# Patient Record
Sex: FEMALE | Race: BLACK OR AFRICAN AMERICAN | NOT HISPANIC OR LATINO | URBAN - NONMETROPOLITAN AREA
[De-identification: names, ages, dates, MRNs, and addresses within clinical notes are randomized per-mention and may not be internally consistent; named-entity substitution may affect disease eponyms.]

---

## 2020-08-11 ENCOUNTER — OFFICE VISIT (OUTPATIENT)
Dept: URBAN - NONMETROPOLITAN AREA CLINIC 2 | Facility: CLINIC | Age: 65
End: 2020-08-11
Payer: MEDICARE

## 2020-08-11 DIAGNOSIS — K85.90 PANCREATITIS: ICD-10-CM

## 2020-08-11 DIAGNOSIS — K21.9 ESOPHAGEAL REFLUX: ICD-10-CM

## 2020-08-11 DIAGNOSIS — Z12.11 COLON CANCER SCREENING: ICD-10-CM

## 2020-08-11 DIAGNOSIS — R19.7 DIARRHEA: ICD-10-CM

## 2020-08-11 DIAGNOSIS — K57.92 DIVERTICULITIS: ICD-10-CM

## 2020-08-11 DIAGNOSIS — K57.30 DIVERTICULA OF COLON: ICD-10-CM

## 2020-08-11 DIAGNOSIS — D50.9 IDA (IRON DEFICIENCY ANEMIA): ICD-10-CM

## 2020-08-11 PROCEDURE — 3017F COLORECTAL CA SCREEN DOC REV: CPT | Performed by: INTERNAL MEDICINE

## 2020-08-11 PROCEDURE — G8427 DOCREV CUR MEDS BY ELIG CLIN: HCPCS | Performed by: INTERNAL MEDICINE

## 2020-08-11 PROCEDURE — G9903 PT SCRN TBCO ID AS NON USER: HCPCS | Performed by: INTERNAL MEDICINE

## 2020-08-11 PROCEDURE — 99213 OFFICE O/P EST LOW 20 MIN: CPT | Performed by: INTERNAL MEDICINE

## 2020-08-11 RX ORDER — HYOSCYAMINE SULFATE 0.12 MG/1
PLACE 1 TABLET UNDER TONGUE BY TRANSLINGUAL ROUTE EVERY 4 TO 6 HOURS AS NEEDED  SPASM/ABDOMINAL PAIN TABLET, ORALLY DISINTEGRATING ORAL
Qty: 60 | Refills: 2 | Status: ACTIVE | COMMUNITY
Start: 2019-06-24

## 2020-08-11 RX ORDER — ATENOLOL AND CHLORTHALIDONE 100; 25 MG/1; MG/1
TAKE 1 TABLET BY ORAL ROUTE ONCE DAILY TABLET ORAL 1
Qty: 0 | Refills: 0 | Status: ACTIVE | COMMUNITY
Start: 1900-01-01

## 2020-08-11 RX ORDER — PANTOPRAZOLE SODIUM 40 MG/1
TAKE 1 TABLET (40 MG) BY ORAL ROUTE ONCE DAILY TABLET, DELAYED RELEASE ORAL 1
Qty: 90 | Refills: 3 | Status: ACTIVE | COMMUNITY
Start: 2020-02-13 | End: 2021-02-07

## 2020-08-11 RX ORDER — FAMOTIDINE 20 MG/1
TAKE 1 TABLET (20 MG) BY ORAL ROUTE 2 TIMES PER DAY TABLET ORAL 2
Qty: 180 | Refills: 3 | Status: ACTIVE | COMMUNITY
Start: 2020-01-02 | End: 2020-12-27

## 2020-08-11 NOTE — HPI-TODAY'S VISIT:
8/11/20 The patient presents today for follow-up of her anemia and history of abdominal pain with diarrhea.  Since our last visit, her reflux has improved on PPI.  She also has been feeling better since she has been on a probiotic.  She does continue to have occasional diarrhea.  She was recently put on colchicine for her gout, and she does think now that she is off the colchicine, her diarrhea has improved.  She does have labs due this week with hematology.  We do not have these results today.  Overall, from a GI standpoint, she is feeling quite well today. History Of Present Illness    2/13/2020 Mrs. Ritter presents for [facility] of IRINEO, history of pancreatitis, diverticulitis and epigastric abdominal pain. Since her last visit her labs show mildly positive H. pylori IGG. She did not get the pepcid as it is on back order. She continues to have epgiastric abdominal pain with dyspepsia. MB   1/2/2020 Mrs. ritter presents for follow up of IRINEO, pancreatitis, and diverticulitis. Since her last visit she has developed worsening post prandial epigastric abdominal pain. This has increased over the last week. This is not acute like the pancreatitis. She does not have nausea or vomiting with the pain. She is not taking anything for the symptoms. She denies any significant NSAID use. Her last EGD was in 2018 with gastritis. Her pancreatitis was diagnosed after diverticulitis and likely medication induced. Today this is her main complaint. MB  6/24/2019 Mrs. Ritter presents for follow up of IRINEO, Pancreatitis, and diverticulitis. Since her last visit her repeat MRCP shows no recurrence or chronic findings of pancreatitis. Her labs reveal significant IRINEO s/p EGD/Colon last year with no etiology. She is s/p VCE with gastritis, no bleeding or lesions. She is following with Dr. Stratton s/p IV iron with H/H up to above 10. She recently had another flare of diverticulitis documented on CT in sigmoid at [facility]. She was treated with flagyl and cipro and had to go back to the ED with epigastric pain after being on the flagyl again and this was discontinued. She is still taking her ranitidine daily and zenpep AC with improvement in her bowels, she does have some mild LLQ pain after her diverticulitis flare. Today she is doing better otherwise. MB

## 2020-10-28 ENCOUNTER — ERX REFILL RESPONSE (OUTPATIENT)
Age: 65
End: 2020-10-28

## 2020-10-28 RX ORDER — PANTOPRAZOLE SODIUM 40 MG/1
TAKE 1 TABLET BY MOUTH  ONCE DAILY TABLET, DELAYED RELEASE ORAL
Qty: 90 | Refills: 3

## 2021-02-09 ENCOUNTER — OFFICE VISIT (OUTPATIENT)
Dept: URBAN - NONMETROPOLITAN AREA CLINIC 2 | Facility: CLINIC | Age: 66
End: 2021-02-09
Payer: MEDICARE

## 2021-02-09 DIAGNOSIS — K59.00 CONSTIPATION, UNSPECIFIED CONSTIPATION TYPE: ICD-10-CM

## 2021-02-09 DIAGNOSIS — K85.90 PANCREATITIS: ICD-10-CM

## 2021-02-09 DIAGNOSIS — R19.7 DIARRHEA: ICD-10-CM

## 2021-02-09 DIAGNOSIS — K57.92 DIVERTICULITIS: ICD-10-CM

## 2021-02-09 DIAGNOSIS — K21.9 ESOPHAGEAL REFLUX: ICD-10-CM

## 2021-02-09 DIAGNOSIS — D50.9 IDA (IRON DEFICIENCY ANEMIA): ICD-10-CM

## 2021-02-09 DIAGNOSIS — K57.30 DIVERTICULA OF COLON: ICD-10-CM

## 2021-02-09 DIAGNOSIS — Z12.11 COLON CANCER SCREENING: ICD-10-CM

## 2021-02-09 DIAGNOSIS — R10.32 LLQ ABDOMINAL PAIN: ICD-10-CM

## 2021-02-09 PROCEDURE — G8427 DOCREV CUR MEDS BY ELIG CLIN: HCPCS | Performed by: INTERNAL MEDICINE

## 2021-02-09 PROCEDURE — G8482 FLU IMMUNIZE ORDER/ADMIN: HCPCS | Performed by: INTERNAL MEDICINE

## 2021-02-09 PROCEDURE — 99214 OFFICE O/P EST MOD 30 MIN: CPT | Performed by: INTERNAL MEDICINE

## 2021-02-09 PROCEDURE — 3017F COLORECTAL CA SCREEN DOC REV: CPT | Performed by: INTERNAL MEDICINE

## 2021-02-09 RX ORDER — HYOSCYAMINE SULFATE 0.12 MG/1
PLACE 1 TABLET UNDER TONGUE BY TRANSLINGUAL ROUTE EVERY 4 TO 6 HOURS AS NEEDED  SPASM/ABDOMINAL PAIN TABLET, ORALLY DISINTEGRATING ORAL
Qty: 60 | Refills: 2 | Status: ACTIVE | COMMUNITY
Start: 2019-06-24

## 2021-02-09 RX ORDER — ATENOLOL AND CHLORTHALIDONE 100; 25 MG/1; MG/1
TAKE 1 TABLET BY ORAL ROUTE ONCE DAILY TABLET ORAL 1
Qty: 0 | Refills: 0 | Status: ACTIVE | COMMUNITY
Start: 1900-01-01

## 2021-02-09 RX ORDER — DICYCLOMINE HYDROCHLORIDE 10 MG/1
1 CAPSULE CAPSULE ORAL
Qty: 90 CAPSULE | Refills: 6 | OUTPATIENT
Start: 2021-02-09 | End: 2021-09-07

## 2021-02-09 RX ORDER — PANTOPRAZOLE SODIUM 40 MG/1
TAKE 1 TABLET BY MOUTH  ONCE DAILY TABLET, DELAYED RELEASE ORAL
Qty: 90 | Refills: 3 | Status: ACTIVE | COMMUNITY

## 2021-02-09 NOTE — HPI-TODAY'S VISIT:
8/11/20 The patient presents today for follow-up of her anemia and history of abdominal pain with diarrhea.  Since our last visit, her reflux has improved on PPI.  She also has been feeling better since she has been on a probiotic.  She does continue to have occasional diarrhea.  She was recently put on colchicine for her gout, and she does think now that she is off the colchicine, her diarrhea has improved.  She does have labs due this week with hematology.  We do not have these results today.  Overall, from a GI standpoint, she is feeling quite well today. History Of Present Illness    2/13/2020 Mrs. Ritter presents for [facility] of [place], history of pancreatitis, diverticulitis and epigastric abdominal pain. Since her last visit her labs show mildly positive H. pylori IGG. She did not get the pepcid as it is on back order. She continues to have epgiastric abdominal pain with dyspepsia. MB  1/2/2020 Mrs. ritter presents for follow up of IRINEO, pancreatitis, and diverticulitis. Since her last visit she has developed worsening post prandial epigastric abdominal pain. This has increased over the last week. This is not acute like the pancreatitis. She does not have nausea or vomiting with the pain. She is not taking anything for the symptoms. She denies any significant NSAID use. Her last EGD was in 2018 with gastritis. Her pancreatitis was diagnosed after diverticulitis and likely medication induced. Today this is her main complaint. MB 6/24/2019 Mrs. Ritter presents for follow up of IRINEO, Pancreatitis, and diverticulitis. Since her last visit her repeat MRCP shows no recurrence or chronic findings of pancreatitis. Her labs reveal significant IRINEO s/p EGD/Colon last year with no etiology. She is s/p VCE with gastritis, no bleeding or lesions. She is following with Dr. Stratton s/p IV iron with H/H up to above 10. She recently had another flare of diverticulitis documented on CT in sigmoid at [facility]. She was treated with flagyl and cipro and had to go back to the ED with epigastric pain after being on the flagyl again and this was discontinued. She is still taking her ranitidine daily and zenpep AC with improvement in her bowels, she does have some mild LLQ pain after her diverticulitis flare. Today she is doing better otherwise. MB 2-9-21 Ms. Ritter presents today for follow-up of her anemia and abdominal pain.  Since our last visit, she has developed worsening left lower quadrant abdominal pain.  She feels that her bowels are not moving as regularly as they were previously.  She is taking daily Metamucil, but she is not taking anything else for her bowels.  She continues to take Protonix 40 mg once a day, and this does seem to control her reflux.  She does have a history of diverticulosis as well.  She has had an EGD and colonoscopy in her work-up for iron deficiency anemia.  She thinks she has had her blood work done recently, but this was for gout.  She is willing to have her blood work really drawn today.  Overall, she continues to complain of left lower quadrant abdominal pain with constipation.

## 2021-02-10 ENCOUNTER — TELEPHONE ENCOUNTER (OUTPATIENT)
Dept: URBAN - METROPOLITAN AREA CLINIC 92 | Facility: CLINIC | Age: 66
End: 2021-02-10

## 2021-02-10 LAB
A/G RATIO: 1.3
ALBUMIN: 4.1
ALKALINE PHOSPHATASE: 103
ALT (SGPT): 5
AST (SGOT): 6
BASO (ABSOLUTE): 0.1
BASOS: 0
BILIRUBIN, TOTAL: 0.3
BUN/CREATININE RATIO: 21
BUN: 24
CALCIUM: 10.2
CARBON DIOXIDE, TOTAL: 28
CHLORIDE: 99
CREATININE: 1.16
EGFR IF AFRICN AM: 76
EGFR IF NONAFRICN AM: 66
EOS (ABSOLUTE): 0.4
EOS: 2
GLOBULIN, TOTAL: 3.2
GLUCOSE: 143
HEMATOCRIT: 32.7
HEMATOLOGY COMMENTS:: (no result)
HEMOGLOBIN: 10.8
IMMATURE CELLS: (no result)
IMMATURE GRANS (ABS): 0.1
IMMATURE GRANULOCYTES: 1
LYMPHS (ABSOLUTE): 1.8
LYMPHS: 12
MCH: 29.4
MCHC: 33
MCV: 89
MONOCYTES(ABSOLUTE): 0.7
MONOCYTES: 4
NEUTROPHILS (ABSOLUTE): 12.4
NEUTROPHILS: 81
NRBC: (no result)
PLATELETS: 278
POTASSIUM: 4.2
PROTEIN, TOTAL: 7.3
RBC: 3.67
RDW: 13.5
SODIUM: 142
WBC: 15.4

## 2021-02-10 RX ORDER — DICYCLOMINE HYDROCHLORIDE 10 MG/1
1 CAPSULE CAPSULE ORAL
Qty: 60 CAPSULE | Refills: 11
Start: 2021-02-09 | End: 2022-02-04

## 2021-05-11 ENCOUNTER — WEB ENCOUNTER (OUTPATIENT)
Dept: URBAN - NONMETROPOLITAN AREA CLINIC 2 | Facility: CLINIC | Age: 66
End: 2021-05-11

## 2021-05-11 ENCOUNTER — OFFICE VISIT (OUTPATIENT)
Dept: URBAN - NONMETROPOLITAN AREA CLINIC 2 | Facility: CLINIC | Age: 66
End: 2021-05-11
Payer: MEDICARE

## 2021-05-11 VITALS
HEART RATE: 83 BPM | WEIGHT: 154 LBS | DIASTOLIC BLOOD PRESSURE: 75 MMHG | SYSTOLIC BLOOD PRESSURE: 111 MMHG | HEIGHT: 68 IN | TEMPERATURE: 97.2 F | BODY MASS INDEX: 23.34 KG/M2

## 2021-05-11 DIAGNOSIS — D50.9 IDA (IRON DEFICIENCY ANEMIA): ICD-10-CM

## 2021-05-11 DIAGNOSIS — K21.9 ESOPHAGEAL REFLUX: ICD-10-CM

## 2021-05-11 DIAGNOSIS — Z12.11 COLON CANCER SCREENING: ICD-10-CM

## 2021-05-11 DIAGNOSIS — R19.7 DIARRHEA: ICD-10-CM

## 2021-05-11 DIAGNOSIS — K85.90 PANCREATITIS: ICD-10-CM

## 2021-05-11 DIAGNOSIS — K57.92 DIVERTICULITIS: ICD-10-CM

## 2021-05-11 DIAGNOSIS — K59.00 CONSTIPATION, UNSPECIFIED CONSTIPATION TYPE: ICD-10-CM

## 2021-05-11 PROCEDURE — 99214 OFFICE O/P EST MOD 30 MIN: CPT | Performed by: INTERNAL MEDICINE

## 2021-05-11 RX ORDER — ATENOLOL AND CHLORTHALIDONE 100; 25 MG/1; MG/1
TAKE 1 TABLET BY ORAL ROUTE ONCE DAILY TABLET ORAL 1
Qty: 0 | Refills: 0 | Status: ACTIVE | COMMUNITY
Start: 1900-01-01

## 2021-05-11 RX ORDER — HYOSCYAMINE SULFATE 0.12 MG/1
PLACE 1 TABLET UNDER TONGUE BY TRANSLINGUAL ROUTE EVERY 4 TO 6 HOURS AS NEEDED  SPASM/ABDOMINAL PAIN TABLET, ORALLY DISINTEGRATING ORAL
Qty: 60 | Refills: 2 | Status: ACTIVE | COMMUNITY
Start: 2019-06-24

## 2021-05-11 RX ORDER — DICYCLOMINE HYDROCHLORIDE 10 MG/1
1 CAPSULE CAPSULE ORAL
Qty: 60 CAPSULE | Refills: 11 | Status: ACTIVE | COMMUNITY
Start: 2021-02-09 | End: 2022-02-04

## 2021-05-11 RX ORDER — PANTOPRAZOLE SODIUM 40 MG/1
TAKE 1 TABLET BY MOUTH  ONCE DAILY TABLET, DELAYED RELEASE ORAL
Qty: 90 | Refills: 3 | Status: ACTIVE | COMMUNITY

## 2021-05-11 RX ORDER — AMOXICILLIN AND CLAVULANATE POTASSIUM 875; 125 MG/1; MG/1
1 TABLET TABLET, FILM COATED ORAL
Qty: 20 | OUTPATIENT
Start: 2021-05-11 | End: 2021-05-21

## 2021-05-11 RX ORDER — FLUCONAZOLE 150 MG/1
1 TABLET TABLET ORAL DAILY
Qty: 1 TABLET | Refills: 1 | OUTPATIENT
Start: 2021-05-11 | End: 2021-05-13

## 2021-05-11 NOTE — HPI-TODAY'S VISIT:
6/24/2019 Mrs. Ritter presents for follow up of IRINEO, Pancreatitis, and diverticulitis. Since her last visit her repeat MRCP shows no recurrence or chronic findings of pancreatitis. Her labs reveal significant IRINEO s/p EGD/Colon last year with no etiology. She is s/p VCE with gastritis, no bleeding or lesions. She is following with Dr. Stratton s/p IV iron with H/H up to above 10. She recently had another flare of diverticulitis documented on CT in sigmoid at [facility]. She was treated with flagyl and cipro and had to go back to the ED with epigastric pain after being on the flagyl again and this was discontinued. She is still taking her ranitidine daily and zenpep AC with improvement in her bowels, she does have some mild LLQ pain after her diverticulitis flare. Today she is doing better otherwise. MB  1/2/2020 Mrs. ritter presents for follow up of IRINEO, pancreatitis, and diverticulitis. Since her last visit she has developed worsening post prandial epigastric abdominal pain. This has increased over the last week. This is not acute like the pancreatitis. She does not have nausea or vomiting with the pain. She is not taking anything for the symptoms. She denies any significant NSAID use. Her last EGD was in 2018 with gastritis. Her pancreatitis was diagnosed after diverticulitis and likely medication induced. Today this is her main complaint. MB  2/13/2020 Mrs. Ritter presents for [facility] of [place]O, history of pancreatitis, diverticulitis and epigastric abdominal pain. Since her last visit her labs show mildly positive H. pylori IGG. She did not get the pepcid as it is on back order. She continues to have epgiastric abdominal pain with dyspepsia. MB   8/11/20 The patient presents today for follow-up of her anemia and history of abdominal pain with diarrhea.  Since our last visit, her reflux has improved on PPI.  She also has been feeling better since she has been on a probiotic.  She does continue to have occasional diarrhea.  She was recently put on colchicine for her gout, and she does think now that she is off the colchicine, her diarrhea has improved.  She does have labs due this week with hematology.  We do not have these results today.  Overall, from a GI standpoint, she is feeling quite well today.  2/9/2021 Ms. Ritter presents today for follow-up of her anemia and abdominal pain.  Since our last visit, she has developed worsening left lower quadrant abdominal pain.  She feels that her bowels are not moving as regularly as they were previously.  She is taking daily Metamucil, but she is not taking anything else for her bowels.  She continues to take Protonix 40 mg once a day, and this does seem to control her reflux.  She does have a history of diverticulosis as well.  She has had an EGD and colonoscopy in her work-up for iron deficiency anemia.  She thinks she has had her blood work done recently, but this was for gout.  She is willing to have her blood work really drawn today.  Overall, she continues to complain of left lower quadrant abdominal pain with constipation.  5/11/2021 Mrs. Ritter presents for follow-up from [facility] with recent evaluation in the ED for abdominal pain diarrhea and rectal bleeding.  She had a contrasted CT scan that shows left-sided colitis, there is not 1 specific diverticula inflamed but she has moderate diverticulosis of the left colon.  She does have a history of documented diverticulitis last in 2019.  At that time she was treated with Flagyl and Rocephin and subsequently developed pancreatitis.  She has a history of gallstone pancreatitis in the distant past, there was concern that the Flagyl caused pancreatitis during her diverticulitis treatment she was given Flagyl and Omnicef on discharge.  She started the Flagyl with severe epigastric pain nausea and vomiting.  She has been off antibiotics for 24 hours and her left lower quadrant pain and diarrhea have worsened.  She does notice bright red blood per rectum.  Her last colonoscopy was by Dr. Mclaughlin in 2018 with left-sided diverticulosis.  She is not allergic to penicillin.  She does not feel like she is having any symptoms of pancreatitis.  Today she agrees to pursue Augmentin therapy for diverticulitis.  MB

## 2021-05-13 ENCOUNTER — TELEPHONE ENCOUNTER (OUTPATIENT)
Dept: URBAN - METROPOLITAN AREA CLINIC 92 | Facility: CLINIC | Age: 66
End: 2021-05-13

## 2021-07-15 ENCOUNTER — OFFICE VISIT (OUTPATIENT)
Dept: URBAN - NONMETROPOLITAN AREA CLINIC 2 | Facility: CLINIC | Age: 66
End: 2021-07-15

## 2021-07-15 RX ORDER — ATENOLOL AND CHLORTHALIDONE 100; 25 MG/1; MG/1
TAKE 1 TABLET BY ORAL ROUTE ONCE DAILY TABLET ORAL 1
Qty: 0 | Refills: 0 | Status: ACTIVE | COMMUNITY
Start: 1900-01-01

## 2021-07-15 RX ORDER — PANTOPRAZOLE SODIUM 40 MG/1
TAKE 1 TABLET BY MOUTH  ONCE DAILY TABLET, DELAYED RELEASE ORAL
Qty: 90 | Refills: 3 | Status: ACTIVE | COMMUNITY

## 2021-07-15 RX ORDER — HYOSCYAMINE SULFATE 0.12 MG/1
PLACE 1 TABLET UNDER TONGUE BY TRANSLINGUAL ROUTE EVERY 4 TO 6 HOURS AS NEEDED  SPASM/ABDOMINAL PAIN TABLET, ORALLY DISINTEGRATING ORAL
Qty: 60 | Refills: 2 | Status: ACTIVE | COMMUNITY
Start: 2019-06-24

## 2021-07-15 RX ORDER — DICYCLOMINE HYDROCHLORIDE 10 MG/1
1 CAPSULE CAPSULE ORAL
Qty: 60 CAPSULE | Refills: 11 | Status: ACTIVE | COMMUNITY
Start: 2021-02-09 | End: 2022-02-04

## 2021-10-18 ENCOUNTER — LAB OUTSIDE AN ENCOUNTER (OUTPATIENT)
Dept: URBAN - NONMETROPOLITAN AREA CLINIC 2 | Facility: CLINIC | Age: 66
End: 2021-10-18

## 2021-10-18 ENCOUNTER — OFFICE VISIT (OUTPATIENT)
Dept: URBAN - NONMETROPOLITAN AREA CLINIC 2 | Facility: CLINIC | Age: 66
End: 2021-10-18
Payer: MEDICARE

## 2021-10-18 VITALS
HEART RATE: 77 BPM | DIASTOLIC BLOOD PRESSURE: 82 MMHG | TEMPERATURE: 97.6 F | HEIGHT: 68 IN | WEIGHT: 159 LBS | SYSTOLIC BLOOD PRESSURE: 133 MMHG | BODY MASS INDEX: 24.1 KG/M2

## 2021-10-18 DIAGNOSIS — R19.7 DIARRHEA: ICD-10-CM

## 2021-10-18 DIAGNOSIS — Z12.11 COLON CANCER SCREENING: ICD-10-CM

## 2021-10-18 DIAGNOSIS — K57.92 DIVERTICULITIS: ICD-10-CM

## 2021-10-18 DIAGNOSIS — K85.90 PANCREATITIS: ICD-10-CM

## 2021-10-18 DIAGNOSIS — K21.9 ESOPHAGEAL REFLUX: ICD-10-CM

## 2021-10-18 DIAGNOSIS — D50.9 IDA (IRON DEFICIENCY ANEMIA): ICD-10-CM

## 2021-10-18 DIAGNOSIS — K59.00 CONSTIPATION, UNSPECIFIED CONSTIPATION TYPE: ICD-10-CM

## 2021-10-18 PROCEDURE — 99214 OFFICE O/P EST MOD 30 MIN: CPT | Performed by: NURSE PRACTITIONER

## 2021-10-18 RX ORDER — ATENOLOL AND CHLORTHALIDONE 100; 25 MG/1; MG/1
TAKE 1 TABLET BY ORAL ROUTE ONCE DAILY TABLET ORAL 1
Qty: 0 | Refills: 0 | Status: ACTIVE | COMMUNITY
Start: 1900-01-01

## 2021-10-18 RX ORDER — PANTOPRAZOLE SODIUM 40 MG/1
TAKE 1 TABLET BY MOUTH  ONCE DAILY TABLET, DELAYED RELEASE ORAL
Qty: 90 | Refills: 3 | Status: ACTIVE | COMMUNITY

## 2021-10-18 RX ORDER — HYOSCYAMINE SULFATE 0.12 MG/1
PLACE 1 TABLET UNDER TONGUE BY TRANSLINGUAL ROUTE EVERY 4 TO 6 HOURS AS NEEDED  SPASM/ABDOMINAL PAIN TABLET, ORALLY DISINTEGRATING ORAL
Qty: 60 | Refills: 2 | Status: ACTIVE | COMMUNITY
Start: 2019-06-24

## 2021-10-18 RX ORDER — DICYCLOMINE HYDROCHLORIDE 10 MG/1
1 CAPSULE CAPSULE ORAL
Qty: 60 CAPSULE | Refills: 11 | Status: ACTIVE | COMMUNITY
Start: 2021-02-09 | End: 2022-02-04

## 2021-10-18 NOTE — HPI-TODAY'S VISIT:
6/24/2019 Mrs. Ritter presents for follow up of IRINEO, Pancreatitis, and diverticulitis. Since her last visit her repeat MRCP shows no recurrence or chronic findings of pancreatitis. Her labs reveal significant IRINOE s/p EGD/Colon last year with no etiology. She is s/p VCE with gastritis, no bleeding or lesions. She is following with Dr. Stratton s/p IV iron with H/H up to above 10. She recently had another flare of diverticulitis documented on CT in sigmoid at [facility]. She was treated with flagyl and cipro and had to go back to the ED with epigastric pain after being on the flagyl again and this was discontinued. She is still taking her ranitidine daily and zenpep AC with improvement in her bowels, she does have some mild LLQ pain after her diverticulitis flare. Today she is doing better otherwise. MB  1/2/2020 Mrs. ritter presents for follow up of IRINEO, pancreatitis, and diverticulitis. Since her last visit she has developed worsening post prandial epigastric abdominal pain. This has increased over the last week. This is not acute like the pancreatitis. She does not have nausea or vomiting with the pain. She is not taking anything for the symptoms. She denies any significant NSAID use. Her last EGD was in 2018 with gastritis. Her pancreatitis was diagnosed after diverticulitis and likely medication induced. Today this is her main complaint. MB  2/13/2020 Mrs. Ritter presents for [facility] of [place], history of pancreatitis, diverticulitis and epigastric abdominal pain. Since her last visit her labs show mildly positive H. pylori IGG. She did not get the pepcid as it is on back order. She continues to have epgiastric abdominal pain with dyspepsia. MB   8/11/20 The patient presents today for follow-up of her anemia and history of abdominal pain with diarrhea.  Since our last visit, her reflux has improved on PPI.  She also has been feeling better since she has been on a probiotic.  She does continue to have occasional diarrhea.  She was recently put on colchicine for her gout, and she does think now that she is off the colchicine, her diarrhea has improved.  She does have labs due this week with hematology.  We do not have these results today.  Overall, from a GI standpoint, she is feeling quite well today.  2/9/2021 Ms. Ritter presents today for follow-up of her anemia and abdominal pain.  Since our last visit, she has developed worsening left lower quadrant abdominal pain.  She feels that her bowels are not moving as regularly as they were previously.  She is taking daily Metamucil, but she is not taking anything else for her bowels.  She continues to take Protonix 40 mg once a day, and this does seem to control her reflux.  She does have a history of diverticulosis as well.  She has had an EGD and colonoscopy in her work-up for iron deficiency anemia.  She thinks she has had her blood work done recently, but this was for gout.  She is willing to have her blood work really drawn today.  Overall, she continues to complain of left lower quadrant abdominal pain with constipation.  5/11/2021 Mrs. Ritter presents for follow-up from [facility] with recent evaluation in the ED for abdominal pain diarrhea and rectal bleeding.  She had a contrasted CT scan that shows left-sided colitis, there is not 1 specific diverticula inflamed but she has moderate diverticulosis of the left colon.  She does have a history of documented diverticulitis last in 2019.  At that time she was treated with Flagyl and Rocephin and subsequently developed pancreatitis.  She has a history of gallstone pancreatitis in the distant past, there was concern that the Flagyl caused pancreatitis during her diverticulitis treatment she was given Flagyl and Omnicef on discharge.  She started the Flagyl with severe epigastric pain nausea and vomiting.  She has been off antibiotics for 24 hours and her left lower quadrant pain and diarrhea have worsened.  She does notice bright red blood per rectum.  Her last colonoscopy was by Dr. Mclaughlin in 2018 with left-sided diverticulosis.  She is not allergic to penicillin.  She does not feel like she is having any symptoms of pancreatitis.  Today she agrees to pursue Augmentin therapy for diverticulitis.  MB   10/18/2021 Mrs. Ritter presents for follow-up of diverticulitis.  Since her last visit she completed 5 days of Augmentin.  Her abdominal pain improved, however it caused such bad nausea she could not tolerate the medication for more than the 5 days.  Since discontinuing the antibiotic she has been constipated.  She has not been taking the MiraLAX regularly.  She has been trying to take fiber but does report bloating.  She does have some dull left lower quadrant abdominal aching but no fevers, she does not feel like she is having acute diverticulitis again.  Today we have discussed repeating her colonoscopy and given her persistent anemia and new onset diverticulitis she agrees to pursue.  She agrees to restart the bowel regimen with MiraLAX and fiber daily.  MB

## 2021-10-29 ENCOUNTER — ERX REFILL RESPONSE (OUTPATIENT)
Dept: URBAN - NONMETROPOLITAN AREA CLINIC 2 | Facility: CLINIC | Age: 66
End: 2021-10-29

## 2021-10-29 RX ORDER — PANTOPRAZOLE SODIUM 40 MG/1
TAKE 1 TABLET BY MOUTH  ONCE DAILY TABLET, DELAYED RELEASE ORAL
Qty: 90 | Refills: 3 | OUTPATIENT

## 2021-10-29 RX ORDER — PANTOPRAZOLE SODIUM 40 MG/1
TAKE 1 TABLET BY MOUTH ONCE DAILY TABLET, DELAYED RELEASE ORAL
Qty: 90 TABLET | Refills: 4 | OUTPATIENT

## 2021-11-03 ENCOUNTER — CLAIMS CREATED FROM THE CLAIM WINDOW (OUTPATIENT)
Dept: URBAN - METROPOLITAN AREA CLINIC 4 | Facility: CLINIC | Age: 66
End: 2021-11-03
Payer: MEDICARE

## 2021-11-03 ENCOUNTER — OFFICE VISIT (OUTPATIENT)
Dept: URBAN - NONMETROPOLITAN AREA SURGERY CENTER 1 | Facility: SURGERY CENTER | Age: 66
End: 2021-11-03
Payer: MEDICARE

## 2021-11-03 DIAGNOSIS — K52.89 (LYMPHOCYTIC) MICROSCOPIC COLITIS: ICD-10-CM

## 2021-11-03 DIAGNOSIS — K57.32 DIVERTICULITIS LARGE INTESTINE: ICD-10-CM

## 2021-11-03 DIAGNOSIS — K57.90 DIVERTICULOSIS OF INTESTINE, PART UNSPECIFIED, WITHOUT PERFORATION OR ABSCESS WITHOUT BLEEDING: ICD-10-CM

## 2021-11-03 DIAGNOSIS — R10.32 ABDOMINAL CRAMPING IN LEFT LOWER QUADRANT: ICD-10-CM

## 2021-11-03 PROCEDURE — 45380 COLONOSCOPY AND BIOPSY: CPT | Performed by: INTERNAL MEDICINE

## 2021-11-03 PROCEDURE — 88305 TISSUE EXAM BY PATHOLOGIST: CPT | Performed by: PATHOLOGY

## 2021-11-03 PROCEDURE — G8907 PT DOC NO EVENTS ON DISCHARG: HCPCS | Performed by: INTERNAL MEDICINE

## 2022-01-26 ENCOUNTER — OFFICE VISIT (OUTPATIENT)
Dept: URBAN - NONMETROPOLITAN AREA CLINIC 2 | Facility: CLINIC | Age: 67
End: 2022-01-26
Payer: MEDICARE

## 2022-01-26 VITALS
HEART RATE: 86 BPM | HEIGHT: 68 IN | BODY MASS INDEX: 23.95 KG/M2 | WEIGHT: 158 LBS | SYSTOLIC BLOOD PRESSURE: 115 MMHG | TEMPERATURE: 96.7 F | DIASTOLIC BLOOD PRESSURE: 74 MMHG

## 2022-01-26 DIAGNOSIS — K85.90 PANCREATITIS: ICD-10-CM

## 2022-01-26 DIAGNOSIS — D50.9 IDA (IRON DEFICIENCY ANEMIA): ICD-10-CM

## 2022-01-26 DIAGNOSIS — Z12.11 COLON CANCER SCREENING: ICD-10-CM

## 2022-01-26 DIAGNOSIS — K21.9 ESOPHAGEAL REFLUX: ICD-10-CM

## 2022-01-26 DIAGNOSIS — K59.00 CONSTIPATION, UNSPECIFIED CONSTIPATION TYPE: ICD-10-CM

## 2022-01-26 DIAGNOSIS — R19.7 DIARRHEA: ICD-10-CM

## 2022-01-26 DIAGNOSIS — K57.92 DIVERTICULITIS: ICD-10-CM

## 2022-01-26 PROCEDURE — 99214 OFFICE O/P EST MOD 30 MIN: CPT | Performed by: NURSE PRACTITIONER

## 2022-01-26 RX ORDER — PANTOPRAZOLE SODIUM 40 MG/1
TAKE 1 TABLET BY MOUTH ONCE DAILY TABLET, DELAYED RELEASE ORAL
Qty: 90 TABLET | Refills: 4 | Status: ACTIVE | COMMUNITY

## 2022-01-26 RX ORDER — ATENOLOL AND CHLORTHALIDONE 100; 25 MG/1; MG/1
TAKE 1 TABLET BY ORAL ROUTE ONCE DAILY TABLET ORAL 1
Qty: 0 | Refills: 0 | Status: ACTIVE | COMMUNITY
Start: 1900-01-01

## 2022-01-26 RX ORDER — RIFAXIMIN 550 MG/1
1 TABLET TABLET ORAL THREE TIMES A DAY
Qty: 42 TABLET | Refills: 2 | OUTPATIENT
Start: 2022-01-26 | End: 2022-03-09

## 2022-01-26 RX ORDER — DICYCLOMINE HYDROCHLORIDE 10 MG/1
1 CAPSULE CAPSULE ORAL
Qty: 60 CAPSULE | Refills: 11 | Status: ACTIVE | COMMUNITY
Start: 2021-02-09 | End: 2022-02-04

## 2022-01-26 RX ORDER — HYOSCYAMINE SULFATE 0.12 MG/1
PLACE 1 TABLET UNDER TONGUE BY TRANSLINGUAL ROUTE EVERY 4 TO 6 HOURS AS NEEDED  SPASM/ABDOMINAL PAIN TABLET, ORALLY DISINTEGRATING ORAL
Qty: 60 | Refills: 2 | Status: ACTIVE | COMMUNITY
Start: 2019-06-24

## 2022-01-26 NOTE — HPI-TODAY'S VISIT:
6/24/2019 Mrs. Ritter presents for follow up of IRINEO, Pancreatitis, and diverticulitis. Since her last visit her repeat MRCP shows no recurrence or chronic findings of pancreatitis. Her labs reveal significant IRINEO s/p EGD/Colon last year with no etiology. She is s/p VCE with gastritis, no bleeding or lesions. She is following with Dr. Stratton s/p IV iron with H/H up to above 10. She recently had another flare of diverticulitis documented on CT in sigmoid at [facility]. She was treated with flagyl and cipro and had to go back to the ED with epigastric pain after being on the flagyl again and this was discontinued. She is still taking her ranitidine daily and zenpep AC with improvement in her bowels, she does have some mild LLQ pain after her diverticulitis flare. Today she is doing better otherwise. MB  1/2/2020 Mrs. ritter presents for follow up of IRINEO, pancreatitis, and diverticulitis. Since her last visit she has developed worsening post prandial epigastric abdominal pain. This has increased over the last week. This is not acute like the pancreatitis. She does not have nausea or vomiting with the pain. She is not taking anything for the symptoms. She denies any significant NSAID use. Her last EGD was in 2018 with gastritis. Her pancreatitis was diagnosed after diverticulitis and likely medication induced. Today this is her main complaint. MB  2/13/2020 Mrs. Ritter presents for [facility] of [place], history of pancreatitis, diverticulitis and epigastric abdominal pain. Since her last visit her labs show mildly positive H. pylori IGG. She did not get the pepcid as it is on back order. She continues to have epgiastric abdominal pain with dyspepsia. MB   8/11/20 The patient presents today for follow-up of her anemia and history of abdominal pain with diarrhea.  Since our last visit, her reflux has improved on PPI.  She also has been feeling better since she has been on a probiotic.  She does continue to have occasional diarrhea.  She was recently put on colchicine for her gout, and she does think now that she is off the colchicine, her diarrhea has improved.  She does have labs due this week with hematology.  We do not have these results today.  Overall, from a GI standpoint, she is feeling quite well today.  2/9/2021 Ms. Ritter presents today for follow-up of her anemia and abdominal pain.  Since our last visit, she has developed worsening left lower quadrant abdominal pain.  She feels that her bowels are not moving as regularly as they were previously.  She is taking daily Metamucil, but she is not taking anything else for her bowels.  She continues to take Protonix 40 mg once a day, and this does seem to control her reflux.  She does have a history of diverticulosis as well.  She has had an EGD and colonoscopy in her work-up for iron deficiency anemia.  She thinks she has had her blood work done recently, but this was for gout.  She is willing to have her blood work really drawn today.  Overall, she continues to complain of left lower quadrant abdominal pain with constipation.  5/11/2021 Mrs. Ritter presents for follow-up from [facility] with recent evaluation in the ED for abdominal pain diarrhea and rectal bleeding.  She had a contrasted CT scan that shows left-sided colitis, there is not 1 specific diverticula inflamed but she has moderate diverticulosis of the left colon.  She does have a history of documented diverticulitis last in 2019.  At that time she was treated with Flagyl and Rocephin and subsequently developed pancreatitis.  She has a history of gallstone pancreatitis in the distant past, there was concern that the Flagyl caused pancreatitis during her diverticulitis treatment she was given Flagyl and Omnicef on discharge.  She started the Flagyl with severe epigastric pain nausea and vomiting.  She has been off antibiotics for 24 hours and her left lower quadrant pain and diarrhea have worsened.  She does notice bright red blood per rectum.  Her last colonoscopy was by Dr. Mclaughlin in 2018 with left-sided diverticulosis.  She is not allergic to penicillin.  She does not feel like she is having any symptoms of pancreatitis.  Today she agrees to pursue Augmentin therapy for diverticulitis.  MB   10/18/2021 Mrs. Ritter presents for follow-up of diverticulitis.  Since her last visit she completed 5 days of Augmentin.  Her abdominal pain improved, however it caused such bad nausea she could not tolerate the medication for more than the 5 days.  Since discontinuing the antibiotic she has been constipated.  She has not been taking the MiraLAX regularly.  She has been trying to take fiber but does report bloating.  She does have some dull left lower quadrant abdominal aching but no fevers, she does not feel like she is having acute diverticulitis again.  Today we have discussed repeating her colonoscopy and given her persistent anemia and new onset diverticulitis she agrees to pursue.  She agrees to restart the bowel regimen with MiraLAX and fiber daily.  MB 1/26/2022 Claire Dunaway presents for follow-up of recurrent diverticulitis.  Since her last visit her colonoscopy in late November reveals significant left-sided diverticular disease, mild active inflammation, diverticular spasm, and narrowing due to diverticular disease.  She has had no further severe flares of diverticulitis but does complain of left lower quadrant pressure.  She has a bowel movement every other day.  She is taking MiraLAX daily and Metamucil at night but has run out this week.  Today we had a long discussion regarding constipation.  She does not want to seek surgical evaluation at this point.  In the past year she has had 3 flares that have required antibiotic treatment.  Today she agrees to be aggressive with her bowel regimen and to try Xifaxan for spasm.  If she has another flare would treat her with Augmentin with a history of pancreatitis secondary to gallstones with flare after Flagyl.  She will also need a surgery referral if she has another flare.  MB

## 2022-01-27 ENCOUNTER — TELEPHONE ENCOUNTER (OUTPATIENT)
Dept: URBAN - METROPOLITAN AREA CLINIC 92 | Facility: CLINIC | Age: 67
End: 2022-01-27

## 2022-01-27 LAB
A/G RATIO: 1.2
ALBUMIN: 4.2
ALKALINE PHOSPHATASE: 100
ALT (SGPT): 6
AST (SGOT): 10
BASO (ABSOLUTE): 0
BASOS: 1
BILIRUBIN, TOTAL: 0.2
BUN/CREATININE RATIO: 18
BUN: 23
C-REACTIVE PROTEIN, QUANT: 67
CALCIUM: 10.2
CARBON DIOXIDE, TOTAL: 23
CHLORIDE: 103
CREATININE: 1.29
EGFR IF AFRICN AM: 50
EGFR IF NONAFRICN AM: 43
EOS (ABSOLUTE): 0.3
EOS: 4
GLOBULIN, TOTAL: 3.5
GLUCOSE: 96
HEMATOCRIT: 35.9
HEMATOLOGY COMMENTS:: (no result)
HEMOGLOBIN: 11.5
IMMATURE CELLS: (no result)
IMMATURE GRANS (ABS): 0
IMMATURE GRANULOCYTES: 0
LYMPHS (ABSOLUTE): 1.2
LYMPHS: 15
MCH: 29
MCHC: 32
MCV: 91
MONOCYTES(ABSOLUTE): 0.6
MONOCYTES: 7
NEUTROPHILS (ABSOLUTE): 6.1
NEUTROPHILS: 73
NRBC: (no result)
PLATELETS: 282
POTASSIUM: 4.5
PROTEIN, TOTAL: 7.7
RBC: 3.96
RDW: 13.7
SEDIMENTATION RATE-WESTERGREN: 111
SODIUM: 140
WBC: 8.3

## 2022-02-14 ENCOUNTER — LAB OUTSIDE AN ENCOUNTER (OUTPATIENT)
Dept: URBAN - METROPOLITAN AREA CLINIC 92 | Facility: CLINIC | Age: 67
End: 2022-02-14

## 2022-07-19 ENCOUNTER — OFFICE VISIT (OUTPATIENT)
Dept: URBAN - NONMETROPOLITAN AREA CLINIC 2 | Facility: CLINIC | Age: 67
End: 2022-07-19
Payer: MEDICARE

## 2022-07-19 VITALS
HEIGHT: 68 IN | BODY MASS INDEX: 23.49 KG/M2 | DIASTOLIC BLOOD PRESSURE: 73 MMHG | TEMPERATURE: 97 F | SYSTOLIC BLOOD PRESSURE: 117 MMHG | WEIGHT: 155 LBS

## 2022-07-19 DIAGNOSIS — D50.9 IDA (IRON DEFICIENCY ANEMIA): ICD-10-CM

## 2022-07-19 DIAGNOSIS — R19.7 DIARRHEA: ICD-10-CM

## 2022-07-19 DIAGNOSIS — K57.92 DIVERTICULITIS: ICD-10-CM

## 2022-07-19 DIAGNOSIS — K59.00 CONSTIPATION, UNSPECIFIED CONSTIPATION TYPE: ICD-10-CM

## 2022-07-19 DIAGNOSIS — K21.9 ESOPHAGEAL REFLUX: ICD-10-CM

## 2022-07-19 DIAGNOSIS — K86.1 ACUTE ON CHRONIC PANCREATITIS: ICD-10-CM

## 2022-07-19 DIAGNOSIS — Z12.11 COLON CANCER SCREENING: ICD-10-CM

## 2022-07-19 PROCEDURE — 99214 OFFICE O/P EST MOD 30 MIN: CPT | Performed by: NURSE PRACTITIONER

## 2022-07-19 RX ORDER — PANTOPRAZOLE SODIUM 40 MG/1
TAKE 1 TABLET BY MOUTH ONCE DAILY TABLET, DELAYED RELEASE ORAL
Qty: 90 TABLET | Refills: 4 | Status: ACTIVE | COMMUNITY

## 2022-07-19 RX ORDER — ATENOLOL AND CHLORTHALIDONE 100; 25 MG/1; MG/1
TAKE 1 TABLET BY ORAL ROUTE ONCE DAILY TABLET ORAL 1
Qty: 0 | Refills: 0 | Status: ACTIVE | COMMUNITY
Start: 1900-01-01

## 2022-07-19 RX ORDER — HYOSCYAMINE SULFATE 0.12 MG/1
PLACE 1 TABLET UNDER TONGUE BY TRANSLINGUAL ROUTE EVERY 4 TO 6 HOURS AS NEEDED  SPASM/ABDOMINAL PAIN TABLET, ORALLY DISINTEGRATING ORAL
Qty: 60 | Refills: 2 | Status: ACTIVE | COMMUNITY
Start: 2019-06-24

## 2022-07-19 RX ORDER — AMOXICILLIN AND CLAVULANATE POTASSIUM 875; 125 MG/1; MG/1
1 TABLET TABLET, FILM COATED ORAL
Qty: 20 | OUTPATIENT
Start: 2022-07-19 | End: 2022-07-29

## 2022-07-19 NOTE — HPI-TODAY'S VISIT:
6/24/2019 Mrs. Ritter presents for follow up of IRINEO, Pancreatitis, and diverticulitis. Since her last visit her repeat MRCP shows no recurrence or chronic findings of pancreatitis. Her labs reveal significant IRINEO s/p EGD/Colon last year with no etiology. She is s/p VCE with gastritis, no bleeding or lesions. She is following with Dr. Stratton s/p IV iron with H/H up to above 10. She recently had another flare of diverticulitis documented on CT in sigmoid at [facility]. She was treated with flagyl and cipro and had to go back to the ED with epigastric pain after being on the flagyl again and this was discontinued. She is still taking her ranitidine daily and zenpep AC with improvement in her bowels, she does have some mild LLQ pain after her diverticulitis flare. Today she is doing better otherwise. MB  1/2/2020 Mrs. ritter presents for follow up of IRINEO, pancreatitis, and diverticulitis. Since her last visit she has developed worsening post prandial epigastric abdominal pain. This has increased over the last week. This is not acute like the pancreatitis. She does not have nausea or vomiting with the pain. She is not taking anything for the symptoms. She denies any significant NSAID use. Her last EGD was in 2018 with gastritis. Her pancreatitis was diagnosed after diverticulitis and likely medication induced. Today this is her main complaint. MB  2/13/2020 Mrs. Ritter presents for [facility] of IRINEO, history of pancreatitis, diverticulitis and epigastric abdominal pain. Since her last visit her labs show mildly positive H. pylori IGG. She did not get the pepcid as it is on back order. She continues to have epgiastric abdominal pain with dyspepsia. MB   8/11/20 The patient presents today for follow-up of her anemia and history of abdominal pain with diarrhea.  Since our last visit, her reflux has improved on PPI.  She also has been feeling better since she has been on a probiotic.  She does continue to have occasional diarrhea.  She was recently put on colchicine for her gout, and she does think now that she is off the colchicine, her diarrhea has improved.  She does have labs due this week with hematology.  We do not have these results today.  Overall, from a GI standpoint, she is feeling quite well today.  2/9/2021 Ms. Ritter presents today for follow-up of her anemia and abdominal pain.  Since our last visit, she has developed worsening left lower quadrant abdominal pain.  She feels that her bowels are not moving as regularly as they were previously.  She is taking daily Metamucil, but she is not taking anything else for her bowels.  She continues to take Protonix 40 mg once a day, and this does seem to control her reflux.  She does have a history of diverticulosis as well.  She has had an EGD and colonoscopy in her work-up for iron deficiency anemia.  She thinks she has had her blood work done recently, but this was for gout.  She is willing to have her blood work really drawn today.  Overall, she continues to complain of left lower quadrant abdominal pain with constipation.  5/11/2021 Mrs. Ritter presents for follow-up from [facility] with recent evaluation in the ED for abdominal pain diarrhea and rectal bleeding.  She had a contrasted CT scan that shows left-sided colitis, there is not 1 specific diverticula inflamed but she has moderate diverticulosis of the left colon.  She does have a history of documented diverticulitis last in 2019.  At that time she was treated with Flagyl and Rocephin and subsequently developed pancreatitis.  She has a history of gallstone pancreatitis in the distant past, there was concern that the Flagyl caused pancreatitis during her diverticulitis treatment she was given Flagyl and Omnicef on discharge.  She started the Flagyl with severe epigastric pain nausea and vomiting.  She has been off antibiotics for 24 hours and her left lower quadrant pain and diarrhea have worsened.  She does notice bright red blood per rectum.  Her last colonoscopy was by Dr. Mclaughlin in 2018 with left-sided diverticulosis.  She is not allergic to penicillin.  She does not feel like she is having any symptoms of pancreatitis.  Today she agrees to pursue Augmentin therapy for diverticulitis.  MB   10/18/2021 Mrs. Ritter presents for follow-up of diverticulitis.  Since her last visit she completed 5 days of Augmentin.  Her abdominal pain improved, however it caused such bad nausea she could not tolerate the medication for more than the 5 days.  Since discontinuing the antibiotic she has been constipated.  She has not been taking the MiraLAX regularly.  She has been trying to take fiber but does report bloating.  She does have some dull left lower quadrant abdominal aching but no fevers, she does not feel like she is having acute diverticulitis again.  Today we have discussed repeating her colonoscopy and given her persistent anemia and new onset diverticulitis she agrees to pursue.  She agrees to restart the bowel regimen with MiraLAX and fiber daily.  MB 1/26/2022 Claire Dunaway presents for follow-up of recurrent diverticulitis.  Since her last visit her colonoscopy in late November reveals significant left-sided diverticular disease, mild active inflammation, diverticular spasm, and narrowing due to diverticular disease.  She has had no further severe flares of diverticulitis but does complain of left lower quadrant pressure.  She has a bowel movement every other day.  She is taking MiraLAX daily and Metamucil at night but has run out this week.  Today we had a long discussion regarding constipation.  She does not want to seek surgical evaluation at this point.  In the past year she has had 3 flares that have required antibiotic treatment.  Today she agrees to be aggressive with her bowel regimen and to try Xifaxan for spasm.  If she has another flare would treat her with Augmentin with a history of pancreatitis secondary to gallstones with flare after Flagyl.  She will also need a surgery referral if she has another flare.  MB 7/19/2022 Claire presents for follow-up of diverticulitis.  Since her last visit she has had no further flares.  She is taking MiraLAX daily but has left off her Metamucil recently.  We had a long discussion regarding this importance.  She had no further episodes of pancreatitis.  She denies any diarrhea.  Her reflux is stable on Protonix 40 mg daily, she does not want to wean at this time.  Her lab work in January of this year shows no anemia.  Today we have discussed surgery again.  At this point she continues to defer.  She agrees to consider if she has another flare.  I am getting call in Augmentin for her to have on hand if she needs it.  MB

## 2022-07-27 ENCOUNTER — OFFICE VISIT (OUTPATIENT)
Dept: URBAN - NONMETROPOLITAN AREA CLINIC 2 | Facility: CLINIC | Age: 67
End: 2022-07-27

## 2022-11-29 ENCOUNTER — ERX REFILL RESPONSE (OUTPATIENT)
Dept: URBAN - NONMETROPOLITAN AREA CLINIC 2 | Facility: CLINIC | Age: 67
End: 2022-11-29

## 2022-11-29 RX ORDER — PANTOPRAZOLE SODIUM 40 MG/1
TAKE 1 TABLET BY MOUTH ONCE DAILY TABLET, DELAYED RELEASE ORAL
Qty: 90 TABLET | Refills: 3 | OUTPATIENT

## 2022-11-29 RX ORDER — PANTOPRAZOLE SODIUM 40 MG/1
TAKE 1 TABLET BY MOUTH ONCE DAILY TABLET, DELAYED RELEASE ORAL
Qty: 90 TABLET | Refills: 4 | OUTPATIENT

## 2023-01-17 ENCOUNTER — OFFICE VISIT (OUTPATIENT)
Dept: URBAN - NONMETROPOLITAN AREA CLINIC 2 | Facility: CLINIC | Age: 68
End: 2023-01-17
Payer: MEDICARE

## 2023-01-17 ENCOUNTER — LAB OUTSIDE AN ENCOUNTER (OUTPATIENT)
Dept: URBAN - NONMETROPOLITAN AREA CLINIC 2 | Facility: CLINIC | Age: 68
End: 2023-01-17

## 2023-01-17 VITALS
DIASTOLIC BLOOD PRESSURE: 70 MMHG | TEMPERATURE: 97.8 F | SYSTOLIC BLOOD PRESSURE: 103 MMHG | BODY MASS INDEX: 22.73 KG/M2 | HEART RATE: 80 BPM | WEIGHT: 150 LBS | HEIGHT: 68 IN

## 2023-01-17 DIAGNOSIS — K57.92 DIVERTICULITIS: ICD-10-CM

## 2023-01-17 DIAGNOSIS — R10.32 LLQ ABDOMINAL PAIN: ICD-10-CM

## 2023-01-17 DIAGNOSIS — K21.9 ESOPHAGEAL REFLUX: ICD-10-CM

## 2023-01-17 DIAGNOSIS — Z12.11 COLON CANCER SCREENING: ICD-10-CM

## 2023-01-17 PROCEDURE — 99214 OFFICE O/P EST MOD 30 MIN: CPT | Performed by: NURSE PRACTITIONER

## 2023-01-17 RX ORDER — HYOSCYAMINE SULFATE 0.12 MG/1
PLACE 1 TABLET UNDER TONGUE BY TRANSLINGUAL ROUTE EVERY 4 TO 6 HOURS AS NEEDED  SPASM/ABDOMINAL PAIN TABLET, ORALLY DISINTEGRATING ORAL
Qty: 60 | Refills: 2 | Status: ACTIVE | COMMUNITY
Start: 2019-06-24

## 2023-01-17 RX ORDER — ATENOLOL AND CHLORTHALIDONE 100; 25 MG/1; MG/1
TAKE 1 TABLET BY ORAL ROUTE ONCE DAILY TABLET ORAL 1
Qty: 0 | Refills: 0 | Status: ACTIVE | COMMUNITY
Start: 1900-01-01

## 2023-01-17 RX ORDER — PANTOPRAZOLE SODIUM 40 MG/1
TAKE 1 TABLET BY MOUTH ONCE DAILY TABLET, DELAYED RELEASE ORAL
Qty: 90 TABLET | Refills: 3 | Status: ACTIVE | COMMUNITY

## 2023-01-17 NOTE — HPI-TODAY'S VISIT:
6/24/2019 Mrs. Ritter presents for follow up of IRINEO, Pancreatitis, and diverticulitis. Since her last visit her repeat MRCP shows no recurrence or chronic findings of pancreatitis. Her labs reveal significant IRINEO s/p EGD/Colon last year with no etiology. She is s/p VCE with gastritis, no bleeding or lesions. She is following with Dr. Stratton s/p IV iron with H/H up to above 10. She recently had another flare of diverticulitis documented on CT in sigmoid at [facility] [place]. She was treated with flagyl and cipro and had to go back to the ED with epigastric pain after being on the flagyl again and this was discontinued. She is still taking her ranitidine daily and zenpep AC with improvement in her bowels, she does have some mild LLQ pain after her diverticulitis flare. Today she is doing better otherwise. MB  1/2/2020 Mrs. ritter presents for follow up of IRINEO, pancreatitis, and diverticulitis. Since her last visit she has developed worsening post prandial epigastric abdominal pain. This has increased over the last week. This is not acute like the pancreatitis. She does not have nausea or vomiting with the pain. She is not taking anything for the symptoms. She denies any significant NSAID use. Her last EGD was in 2018 with gastritis. Her pancreatitis was diagnosed after diverticulitis and likely medication induced. Today this is her main complaint. MB  2/13/2020 Mrs. Ritter presents for [facility] of IRINEO, history of pancreatitis, diverticulitis and epigastric abdominal pain. Since her last visit her labs show mildly positive H. pylori IGG. She did not get the pepcid as it is on back order. She continues to have epgiastric abdominal pain with dyspepsia. MB   8/11/20 The patient presents today for follow-up of her anemia and history of abdominal pain with diarrhea.  Since our last visit, her reflux has improved on PPI.  She also has been feeling better since she has been on a probiotic.  She does continue to have occasional diarrhea.  She was recently put on colchicine for her gout, and she does think now that she is off the colchicine, her diarrhea has improved.  She does have labs due this week with hematology.  We do not have these results today.  Overall, from a GI standpoint, she is feeling quite well today.  2/9/2021 Ms. Ritter presents today for follow-up of her anemia and abdominal pain.  Since our last visit, she has developed worsening left lower quadrant abdominal pain.  She feels that her bowels are not moving as regularly as they were previously.  She is taking daily Metamucil, but she is not taking anything else for her bowels.  She continues to take Protonix 40 mg once a day, and this does seem to control her reflux.  She does have a history of diverticulosis as well.  She has had an EGD and colonoscopy in her work-up for iron deficiency anemia.  She thinks she has had her blood work done recently, but this was for gout.  She is willing to have her blood work really drawn today.  Overall, she continues to complain of left lower quadrant abdominal pain with constipation.  5/11/2021 Mrs. Ritter presents for follow-up from [facility] with recent evaluation in the ED for abdominal pain diarrhea and rectal bleeding.  She had a contrasted CT scan that shows left-sided colitis, there is not 1 specific diverticula inflamed but she has moderate diverticulosis of the left colon.  She does have a history of documented diverticulitis last in 2019.  At that time she was treated with Flagyl and Rocephin and subsequently developed pancreatitis.  She has a history of gallstone pancreatitis in the distant past, there was concern that the Flagyl caused pancreatitis during her diverticulitis treatment she was given Flagyl and Omnicef on discharge.  She started the Flagyl with severe epigastric pain nausea and vomiting.  She has been off antibiotics for 24 hours and her left lower quadrant pain and diarrhea have worsened.  She does notice bright red blood per rectum.  Her last colonoscopy was by Dr. Mclaughlin in 2018 with left-sided diverticulosis.  She is not allergic to penicillin.  She does not feel like she is having any symptoms of pancreatitis.  Today she agrees to pursue Augmentin therapy for diverticulitis.  MB   10/18/2021 Mrs. Ritter presents for follow-up of diverticulitis.  Since her last visit she completed 5 days of Augmentin.  Her abdominal pain improved, however it caused such bad nausea she could not tolerate the medication for more than the 5 days.  Since discontinuing the antibiotic she has been constipated.  She has not been taking the MiraLAX regularly.  She has been trying to take fiber but does report bloating.  She does have some dull left lower quadrant abdominal aching but no fevers, she does not feel like she is having acute diverticulitis again.  Today we have discussed repeating her colonoscopy and given her persistent anemia and new onset diverticulitis she agrees to pursue.  She agrees to restart the bowel regimen with MiraLAX and fiber daily.  MB 1/26/2022 Claire Dunaway presents for follow-up of recurrent diverticulitis.  Since her last visit her colonoscopy in late November reveals significant left-sided diverticular disease, mild active inflammation, diverticular spasm, and narrowing due to diverticular disease.  She has had no further severe flares of diverticulitis but does complain of left lower quadrant pressure.  She has a bowel movement every other day.  She is taking MiraLAX daily and Metamucil at night but has run out this week.  Today we had a long discussion regarding constipation.  She does not want to seek surgical evaluation at this point.  In the past year she has had 3 flares that have required antibiotic treatment.  Today she agrees to be aggressive with her bowel regimen and to try Xifaxan for spasm.  If she has another flare would treat her with Augmentin with a history of pancreatitis secondary to gallstones with flare after Flagyl.  She will also need a surgery referral if she has another flare.  MB 7/19/2022 Claire presents for follow-up of diverticulitis.  Since her last visit she has had no further flares.  She is taking MiraLAX daily but has left off her Metamucil recently.  We had a long discussion regarding this importance.  She had no further episodes of pancreatitis.  She denies any diarrhea.  Her reflux is stable on Protonix 40 mg daily, she does not want to wean at this time.  Her lab work in January of this year shows no anemia.  Today we have discussed surgery again.  At this point she continues to defer.  She agrees to consider if she has another flare.  I am getting call in Augmentin for her to have on hand if she needs it.  MB  1/17/2023 Claire Dunaway presents for follow-up of recurrent diverticulitis.  Since her last visit she continues to have intermittent episodes of acute left lower quadrant abdominal pain that may last for days.  She is taking her fiber capsules at night and MiraLAX on a daily basis.  She still has some incomplete evacuation.  Her colonoscopy in November 2021 reveals a sigmoid stricture due to diverticular disease.  Recently she felt like she had a recurrent episode of full-blown diverticulitis, she had a sinus infection at the same time and was treated with amoxicillin.  After 5 days of the amoxicillin her abdominal pain improved however her sinus infection did not and she was transition to a Z-Nam.  Today she  on exam.  We have discussed aggressive bowel regimen.  I am going to proceed with repeat CT imaging to rule active diverticulitis.  She has had a reaction to Flagyl in the past with possible pancreatitis.  If she has active inflammation would proceed with 10 days of Augmentin along with surgery referral.  We are can increase her bowel regimen to MiraLAX twice daily and continue psyllium at night for now.  She understands she needs to proceed to the emergency department with severe pain.  We will follow-up pending her imaging, she is interested in pursuing surgery for recurrent diverticulitis given her past 3 years of episodes.  MB

## 2023-01-18 LAB
A/G RATIO: 1.1
ABSOLUTE BASOPHILS: 47
ABSOLUTE EOSINOPHILS: 250
ABSOLUTE LYMPHOCYTES: 1778
ABSOLUTE MONOCYTES: 476
ABSOLUTE NEUTROPHILS: 5249
ALBUMIN: 4.1
ALKALINE PHOSPHATASE: 85
ALT (SGPT): 8
AST (SGOT): 12
BASOPHILS: 0.6
BILIRUBIN, TOTAL: 0.3
BUN/CREATININE RATIO: 24
BUN: 30
C-REACTIVE PROTEIN, QUANT: 51.9
CALCIUM: 10.3
CARBON DIOXIDE, TOTAL: 27
CHLORIDE: 104
CREATININE: 1.23
EGFR: 48
EOSINOPHILS: 3.2
GLOBULIN, TOTAL: 3.7
GLUCOSE: 119
HEMATOCRIT: 34.7
HEMOGLOBIN: 11.4
LYMPHOCYTES: 22.8
MCH: 29.1
MCHC: 32.9
MCV: 88.5
MONOCYTES: 6.1
MPV: 11
NEUTROPHILS: 67.3
PLATELET COUNT: 277
POTASSIUM: 4.3
PROTEIN, TOTAL: 7.8
RDW: 13.8
RED BLOOD CELL COUNT: 3.92
SED RATE BY MODIFIED: 104
SODIUM: 143
WHITE BLOOD CELL COUNT: 7.8

## 2023-01-19 ENCOUNTER — TELEPHONE ENCOUNTER (OUTPATIENT)
Dept: URBAN - METROPOLITAN AREA CLINIC 92 | Facility: CLINIC | Age: 68
End: 2023-01-19

## 2023-01-19 ENCOUNTER — LAB OUTSIDE AN ENCOUNTER (OUTPATIENT)
Dept: URBAN - METROPOLITAN AREA CLINIC 92 | Facility: CLINIC | Age: 68
End: 2023-01-19

## 2023-02-02 ENCOUNTER — TELEPHONE ENCOUNTER (OUTPATIENT)
Dept: URBAN - NONMETROPOLITAN AREA CLINIC 2 | Facility: CLINIC | Age: 68
End: 2023-02-02

## 2023-02-10 ENCOUNTER — TELEPHONE ENCOUNTER (OUTPATIENT)
Dept: URBAN - NONMETROPOLITAN AREA CLINIC 2 | Facility: CLINIC | Age: 68
End: 2023-02-10

## 2023-06-26 ENCOUNTER — OFFICE VISIT (OUTPATIENT)
Dept: URBAN - NONMETROPOLITAN AREA CLINIC 2 | Facility: CLINIC | Age: 68
End: 2023-06-26
Payer: MEDICARE

## 2023-06-26 VITALS
TEMPERATURE: 97.8 F | DIASTOLIC BLOOD PRESSURE: 79 MMHG | SYSTOLIC BLOOD PRESSURE: 125 MMHG | HEART RATE: 79 BPM | WEIGHT: 149 LBS | HEIGHT: 68 IN | BODY MASS INDEX: 22.58 KG/M2

## 2023-06-26 DIAGNOSIS — K57.92 DIVERTICULITIS: ICD-10-CM

## 2023-06-26 DIAGNOSIS — K21.9 ESOPHAGEAL REFLUX: ICD-10-CM

## 2023-06-26 DIAGNOSIS — K85.90 PANCREATITIS: ICD-10-CM

## 2023-06-26 DIAGNOSIS — R19.7 DIARRHEA: ICD-10-CM

## 2023-06-26 PROBLEM — 301716002: Status: ACTIVE | Noted: 2023-01-17

## 2023-06-26 PROCEDURE — 99214 OFFICE O/P EST MOD 30 MIN: CPT | Performed by: NURSE PRACTITIONER

## 2023-06-26 RX ORDER — PANTOPRAZOLE SODIUM 40 MG/1
TAKE 1 TABLET BY MOUTH ONCE DAILY TABLET, DELAYED RELEASE ORAL
Qty: 90 TABLET | Refills: 3 | Status: ACTIVE | COMMUNITY

## 2023-06-26 RX ORDER — HYOSCYAMINE SULFATE 0.12 MG/1
PLACE 1 TABLET UNDER TONGUE BY TRANSLINGUAL ROUTE EVERY 4 TO 6 HOURS AS NEEDED  SPASM/ABDOMINAL PAIN TABLET, ORALLY DISINTEGRATING ORAL
Qty: 60 | Refills: 2 | Status: ACTIVE | COMMUNITY
Start: 2019-06-24

## 2023-06-26 RX ORDER — ATENOLOL AND CHLORTHALIDONE 100; 25 MG/1; MG/1
TAKE 1 TABLET BY ORAL ROUTE ONCE DAILY TABLET ORAL 1
Qty: 0 | Refills: 0 | Status: ACTIVE | COMMUNITY
Start: 1900-01-01

## 2023-06-26 NOTE — HPI-TODAY'S VISIT:
6/24/2019 Mrs. Ritter presents for follow up of IRINEO, Pancreatitis, and diverticulitis. Since her last visit her repeat MRCP shows no recurrence or chronic findings of pancreatitis. Her labs reveal significant IRINEO s/p EGD/Colon last year with no etiology. She is s/p VCE with gastritis, no bleeding or lesions. She is following with Dr. Stratton s/p IV iron with H/H up to above 10. She recently had another flare of diverticulitis documented on CT in sigmoid at [facility]. She was treated with flagyl and cipro and had to go back to the ED with epigastric pain after being on the flagyl again and this was discontinued. She is still taking her ranitidine daily and zenpep AC with improvement in her bowels, she does have some mild LLQ pain after her diverticulitis flare. Today she is doing better otherwise. MB  1/2/2020 Mrs. ritter presents for follow up of IRINEO, pancreatitis, and diverticulitis. Since her last visit she has developed worsening post prandial epigastric abdominal pain. This has increased over the last week. This is not acute like the pancreatitis. She does not have nausea or vomiting with the pain. She is not taking anything for the symptoms. She denies any significant NSAID use. Her last EGD was in 2018 with gastritis. Her pancreatitis was diagnosed after diverticulitis and likely medication induced. Today this is her main complaint. MB  2/13/2020 Mrs. Ritter presents for [facility] of IRINEO, history of pancreatitis, diverticulitis and epigastric abdominal pain. Since her last visit her labs show mildly positive H. pylori IGG. She did not get the pepcid as it is on back order. She continues to have epgiastric abdominal pain with dyspepsia. MB   8/11/20 The patient presents today for follow-up of her anemia and history of abdominal pain with diarrhea.  Since our last visit, her reflux has improved on PPI.  She also has been feeling better since she has been on a probiotic.  She does continue to have occasional diarrhea.  She was recently put on colchicine for her gout, and she does think now that she is off the colchicine, her diarrhea has improved.  She does have labs due this week with hematology.  We do not have these results today.  Overall, from a GI standpoint, she is feeling quite well today.  2/9/2021 Ms. Ritter presents today for follow-up of her anemia and abdominal pain.  Since our last visit, she has developed worsening left lower quadrant abdominal pain.  She feels that her bowels are not moving as regularly as they were previously.  She is taking daily Metamucil, but she is not taking anything else for her bowels.  She continues to take Protonix 40 mg once a day, and this does seem to control her reflux.  She does have a history of diverticulosis as well.  She has had an EGD and colonoscopy in her work-up for iron deficiency anemia.  She thinks she has had her blood work done recently, but this was for gout.  She is willing to have her blood work really drawn today.  Overall, she continues to complain of left lower quadrant abdominal pain with constipation.  5/11/2021 Mrs. Ritter presents for follow-up from [facility] with recent evaluation in the ED for abdominal pain diarrhea and rectal bleeding.  She had a contrasted CT scan that shows left-sided colitis, there is not 1 specific diverticula inflamed but she has moderate diverticulosis of the left colon.  She does have a history of documented diverticulitis last in 2019.  At that time she was treated with Flagyl and Rocephin and subsequently developed pancreatitis.  She has a history of gallstone pancreatitis in the distant past, there was concern that the Flagyl caused pancreatitis during her diverticulitis treatment she was given Flagyl and Omnicef on discharge.  She started the Flagyl with severe epigastric pain nausea and vomiting.  She has been off antibiotics for 24 hours and her left lower quadrant pain and diarrhea have worsened.  She does notice bright red blood per rectum.  Her last colonoscopy was by Dr. Mclaughlin in 2018 with left-sided diverticulosis.  She is not allergic to penicillin.  She does not feel like she is having any symptoms of pancreatitis.  Today she agrees to pursue Augmentin therapy for diverticulitis.  MB   10/18/2021 Mrs. Ritter presents for follow-up of diverticulitis.  Since her last visit she completed 5 days of Augmentin.  Her abdominal pain improved, however it caused such bad nausea she could not tolerate the medication for more than the 5 days.  Since discontinuing the antibiotic she has been constipated.  She has not been taking the MiraLAX regularly.  She has been trying to take fiber but does report bloating.  She does have some dull left lower quadrant abdominal aching but no fevers, she does not feel like she is having acute diverticulitis again.  Today we have discussed repeating her colonoscopy and given her persistent anemia and new onset diverticulitis she agrees to pursue.  She agrees to restart the bowel regimen with MiraLAX and fiber daily.  MB 1/26/2022 Claire Dunaway presents for follow-up of recurrent diverticulitis.  Since her last visit her colonoscopy in late November reveals significant left-sided diverticular disease, mild active inflammation, diverticular spasm, and narrowing due to diverticular disease.  She has had no further severe flares of diverticulitis but does complain of left lower quadrant pressure.  She has a bowel movement every other day.  She is taking MiraLAX daily and Metamucil at night but has run out this week.  Today we had a long discussion regarding constipation.  She does not want to seek surgical evaluation at this point.  In the past year she has had 3 flares that have required antibiotic treatment.  Today she agrees to be aggressive with her bowel regimen and to try Xifaxan for spasm.  If she has another flare would treat her with Augmentin with a history of pancreatitis secondary to gallstones with flare after Flagyl.  She will also need a surgery referral if she has another flare.  MB 7/19/2022 Claire presents for follow-up of diverticulitis.  Since her last visit she has had no further flares.  She is taking MiraLAX daily but has left off her Metamucil recently.  We had a long discussion regarding this importance.  She had no further episodes of pancreatitis.  She denies any diarrhea.  Her reflux is stable on Protonix 40 mg daily, she does not want to wean at this time.  Her lab work in January of this year shows no anemia.  Today we have discussed surgery again.  At this point she continues to defer.  She agrees to consider if she has another flare.  I am getting call in Augmentin for her to have on hand if she needs it.  MB  1/17/2023 Claire Dunaway presents for follow-up of recurrent diverticulitis.  Since her last visit she continues to have intermittent episodes of acute left lower quadrant abdominal pain that may last for days.  She is taking her fiber capsules at night and MiraLAX on a daily basis.  She still has some incomplete evacuation.  Her colonoscopy in November 2021 reveals a sigmoid stricture due to diverticular disease.  Recently she felt like she had a recurrent episode of full-blown diverticulitis, she had a sinus infection at the same time and was treated with amoxicillin.  After 5 days of the amoxicillin her abdominal pain improved however her sinus infection did not and she was transition to a Z-Nam.  Today she  on exam.  We have discussed aggressive bowel regimen.  I am going to proceed with repeat CT imaging to rule active diverticulitis.  She has had a reaction to Flagyl in the past with possible pancreatitis.  If she has active inflammation would proceed with 10 days of Augmentin along with surgery referral.  We are can increase her bowel regimen to MiraLAX twice daily and continue psyllium at night for now.  She understands she needs to proceed to the emergency department with severe pain.  We will follow-up pending her imaging, she is interested in pursuing surgery for recurrent diverticulitis given her past 3 years of episodes.  MB 6/26/2023 Claire presents for follow-up of recurrent diverticulitis status post sigmoid resection.  She was admitted and underwent sigmoid resection laparoscopic and robotically in April 2023 by Dr. Arnie Giraldo.  She did well postoperatively with mild renal impairment.  Her hemoglobin on discharge was 8.1 but no signs or symptoms of GI bleeding.  She had blood work last week with Dr. Samayoa and this is pending.  She is back on her psyllium at night and 1 capful of MiraLAX daily.  She has had no further flares of abdominal pain.  Her reflux is stable on pantoprazole 40 mg daily and she agrees to wean to 20 mg daily.  Today she is doing well.  MB

## 2023-12-18 ENCOUNTER — CLAIMS CREATED FROM THE CLAIM WINDOW (OUTPATIENT)
Dept: URBAN - NONMETROPOLITAN AREA CLINIC 2 | Facility: CLINIC | Age: 68
End: 2023-12-18
Payer: MEDICARE

## 2023-12-18 ENCOUNTER — DASHBOARD ENCOUNTERS (OUTPATIENT)
Age: 68
End: 2023-12-18

## 2023-12-18 VITALS
BODY MASS INDEX: 25.61 KG/M2 | WEIGHT: 169 LBS | HEIGHT: 68 IN | HEART RATE: 72 BPM | DIASTOLIC BLOOD PRESSURE: 84 MMHG | TEMPERATURE: 98.7 F | SYSTOLIC BLOOD PRESSURE: 137 MMHG

## 2023-12-18 DIAGNOSIS — D50.9 IDA (IRON DEFICIENCY ANEMIA): ICD-10-CM

## 2023-12-18 DIAGNOSIS — Z12.11 COLON CANCER SCREENING: ICD-10-CM

## 2023-12-18 DIAGNOSIS — K85.90 PANCREATITIS: ICD-10-CM

## 2023-12-18 DIAGNOSIS — K59.09 CHANGE IN BOWEL MOVEMENTS INTERMITTENT CONSTIPATION. URGENCY IN THE MORNING.: ICD-10-CM

## 2023-12-18 DIAGNOSIS — K57.92 DIVERTICULITIS: ICD-10-CM

## 2023-12-18 DIAGNOSIS — K21.9 ESOPHAGEAL REFLUX: ICD-10-CM

## 2023-12-18 DIAGNOSIS — R10.32 LLQ ABDOMINAL PAIN: ICD-10-CM

## 2023-12-18 DIAGNOSIS — K59.00 CONSTIPATION, UNSPECIFIED CONSTIPATION TYPE: ICD-10-CM

## 2023-12-18 DIAGNOSIS — R19.7 DIARRHEA: ICD-10-CM

## 2023-12-18 PROBLEM — 14760008: Status: ACTIVE | Noted: 2021-02-09

## 2023-12-18 PROCEDURE — 99214 OFFICE O/P EST MOD 30 MIN: CPT | Performed by: NURSE PRACTITIONER

## 2023-12-18 RX ORDER — HYOSCYAMINE SULFATE 0.12 MG/1
PLACE 1 TABLET UNDER TONGUE BY TRANSLINGUAL ROUTE EVERY 4 TO 6 HOURS AS NEEDED  SPASM/ABDOMINAL PAIN TABLET, ORALLY DISINTEGRATING ORAL
Qty: 60 | Refills: 2 | Status: ACTIVE | COMMUNITY
Start: 2019-06-24

## 2023-12-18 RX ORDER — ATENOLOL AND CHLORTHALIDONE 100; 25 MG/1; MG/1
TAKE 1 TABLET BY ORAL ROUTE ONCE DAILY TABLET ORAL 1
Qty: 0 | Refills: 0 | Status: ACTIVE | COMMUNITY
Start: 1900-01-01

## 2024-06-03 NOTE — HPI-TODAY'S VISIT:
6/24/2019 Mrs. Ritter presents for follow up of IRINEO, Pancreatitis, and diverticulitis. Since her last visit her repeat MRCP shows no recurrence or chronic findings of pancreatitis. Her labs reveal significant IRINEO s/p EGD/Colon last year with no etiology. She is s/p VCE with gastritis, no bleeding or lesions. She is following with Dr. Stratton s/p IV iron with H/H up to above 10. She recently had another flare of diverticulitis documented on CT in sigmoid at [facility]. She was treated with flagyl and cipro and had to go back to the ED with epigastric pain after being on the flagyl again and this was discontinued. She is still taking her ranitidine daily and zenpep AC with improvement in her bowels, she does have some mild LLQ pain after her diverticulitis flare. Today she is doing better otherwise. MB  1/2/2020 Mrs. ritter presents for follow up of IRINEO, pancreatitis, and diverticulitis. Since her last visit she has developed worsening post prandial epigastric abdominal pain. This has increased over the last week. This is not acute like the pancreatitis. She does not have nausea or vomiting with the pain. She is not taking anything for the symptoms. She denies any significant NSAID use. Her last EGD was in 2018 with gastritis. Her pancreatitis was diagnosed after diverticulitis and likely medication induced. Today this is her main complaint. MB  2/13/2020 Mrs. Ritter presents for [facility] of IRINEO, history of pancreatitis, diverticulitis and epigastric abdominal pain. Since her last visit her labs show mildly positive H. pylori IGG. She did not get the pepcid as it is on back order. She continues to have epgiastric abdominal pain with dyspepsia. MB   8/11/20 The patient presents today for follow-up of her anemia and history of abdominal pain with diarrhea.  Since our last visit, her reflux has improved on PPI.  She also has been feeling better since she has been on a probiotic.  She does continue to have occasional diarrhea.  She was recently put on colchicine for her gout, and she does think now that she is off the colchicine, her diarrhea has improved.  She does have labs due this week with hematology.  We do not have these results today.  Overall, from a GI standpoint, she is feeling quite well today.  2/9/2021 Ms. Ritter presents today for follow-up of her anemia and abdominal pain.  Since our last visit, she has developed worsening left lower quadrant abdominal pain.  She feels that her bowels are not moving as regularly as they were previously.  She is taking daily Metamucil, but she is not taking anything else for her bowels.  She continues to take Protonix 40 mg once a day, and this does seem to control her reflux.  She does have a history of diverticulosis as well.  She has had an EGD and colonoscopy in her work-up for iron deficiency anemia.  She thinks she has had her blood work done recently, but this was for gout.  She is willing to have her blood work really drawn today.  Overall, she continues to complain of left lower quadrant abdominal pain with constipation.  5/11/2021 Mrs. Ritter presents for follow-up from [facility] with recent evaluation in the ED for abdominal pain diarrhea and rectal bleeding.  She had a contrasted CT scan that shows left-sided colitis, there is not 1 specific diverticula inflamed but she has moderate diverticulosis of the left colon.  She does have a history of documented diverticulitis last in 2019.  At that time she was treated with Flagyl and Rocephin and subsequently developed pancreatitis.  She has a history of gallstone pancreatitis in the distant past, there was concern that the Flagyl caused pancreatitis during her diverticulitis treatment she was given Flagyl and Omnicef on discharge.  She started the Flagyl with severe epigastric pain nausea and vomiting.  She has been off antibiotics for 24 hours and her left lower quadrant pain and diarrhea have worsened.  She does notice bright red blood per rectum.  Her last colonoscopy was by Dr. Mclaughlin in 2018 with left-sided diverticulosis.  She is not allergic to penicillin.  She does not feel like she is having any symptoms of pancreatitis.  Today she agrees to pursue Augmentin therapy for diverticulitis.  MB   10/18/2021 Mrs. Ritter presents for follow-up of diverticulitis.  Since her last visit she completed 5 days of Augmentin.  Her abdominal pain improved, however it caused such bad nausea she could not tolerate the medication for more than the 5 days.  Since discontinuing the antibiotic she has been constipated.  She has not been taking the MiraLAX regularly.  She has been trying to take fiber but does report bloating.  She does have some dull left lower quadrant abdominal aching but no fevers, she does not feel like she is having acute diverticulitis again.  Today we have discussed repeating her colonoscopy and given her persistent anemia and new onset diverticulitis she agrees to pursue.  She agrees to restart the bowel regimen with MiraLAX and fiber daily.  MB 1/26/2022 Claire Dunaway presents for follow-up of recurrent diverticulitis.  Since her last visit her colonoscopy in late November reveals significant left-sided diverticular disease, mild active inflammation, diverticular spasm, and narrowing due to diverticular disease.  She has had no further severe flares of diverticulitis but does complain of left lower quadrant pressure.  She has a bowel movement every other day.  She is taking MiraLAX daily and Metamucil at night but has run out this week.  Today we had a long discussion regarding constipation.  She does not want to seek surgical evaluation at this point.  In the past year she has had 3 flares that have required antibiotic treatment.  Today she agrees to be aggressive with her bowel regimen and to try Xifaxan for spasm.  If she has another flare would treat her with Augmentin with a history of pancreatitis secondary to gallstones with flare after Flagyl.  She will also need a surgery referral if she has another flare.  MB 7/19/2022 Claire presents for follow-up of diverticulitis.  Since her last visit she has had no further flares.  She is taking MiraLAX daily but has left off her Metamucil recently.  We had a long discussion regarding this importance.  She had no further episodes of pancreatitis.  She denies any diarrhea.  Her reflux is stable on Protonix 40 mg daily, she does not want to wean at this time.  Her lab work in January of this year shows no anemia.  Today we have discussed surgery again.  At this point she continues to defer.  She agrees to consider if she has another flare.  I am getting call in Augmentin for her to have on hand if she needs it.  MB  1/17/2023 Claire Dunaway presents for follow-up of recurrent diverticulitis.  Since her last visit she continues to have intermittent episodes of acute left lower quadrant abdominal pain that may last for days.  She is taking her fiber capsules at night and MiraLAX on a daily basis.  She still has some incomplete evacuation.  Her colonoscopy in November 2021 reveals a sigmoid stricture due to diverticular disease.  Recently she felt like she had a recurrent episode of full-blown diverticulitis, she had a sinus infection at the same time and was treated with amoxicillin.  After 5 days of the amoxicillin her abdominal pain improved however her sinus infection did not and she was transition to a Z-Nam.  Today she  on exam.  We have discussed aggressive bowel regimen.  I am going to proceed with repeat CT imaging to rule active diverticulitis.  She has had a reaction to Flagyl in the past with possible pancreatitis.  If she has active inflammation would proceed with 10 days of Augmentin along with surgery referral.  We are can increase her bowel regimen to MiraLAX twice daily and continue psyllium at night for now.  She understands she needs to proceed to the emergency department with severe pain.  We will follow-up pending her imaging, she is interested in pursuing surgery for recurrent diverticulitis given her past 3 years of episodes.  MB 6/26/2023 Claire presents for follow-up of recurrent diverticulitis status post sigmoid resection.  She was admitted and underwent sigmoid resection laparoscopic and robotically in April 2023 by Dr. Arnie Giraldo.  She did well postoperatively with mild renal impairment.  Her hemoglobin on discharge was 8.1 but no signs or symptoms of GI bleeding.  She had blood work last week with Dr. Samayoa and this is pending.  She is back on her psyllium at night and 1 capful of MiraLAX daily.  She has had no further flares of abdominal pain.  Her reflux is stable on pantoprazole 40 mg daily and she agrees to wean to 20 mg daily.  Today she is doing well.  MB 12/18/2023 Claire presents for follow-up of constipation and history of diverticulitis.  She is doing great postop.  She is on MiraLAX twice daily and 2 capsules of Metamucil at night.  She feels her stools are a little loose, we have discussed she can back the MiraLAX down to once daily.  She has blood work due with her primary care physician in January, she has had some with Dr. Stratton this fall which we will try and get a copy of.  Her reflux is stable on the pantoprazole 20 mg daily.  Today she denies any new GI complaints.  MB Physical Therapy Discharge Summary    Reason for therapy discharge:    Discharged to transitional care facility.    Progress towards therapy goal(s). See goals on Care Plan in [facility] electronic health record for goal details.  Goals partially met.  Barriers to achieving goals:   discharge from facility.    Therapy recommendation(s):    Continued therapy is recommended.  Rationale/Recommendations:  increase strength, endurance and optimize ind.

## 2024-10-17 ENCOUNTER — ERX REFILL RESPONSE (OUTPATIENT)
Dept: URBAN - NONMETROPOLITAN AREA CLINIC 2 | Facility: CLINIC | Age: 69
End: 2024-10-17

## 2025-03-13 ENCOUNTER — OFFICE VISIT (OUTPATIENT)
Dept: URBAN - NONMETROPOLITAN AREA CLINIC 2 | Facility: CLINIC | Age: 70
End: 2025-03-13
Payer: MEDICARE

## 2025-03-13 VITALS
WEIGHT: 169 LBS | BODY MASS INDEX: 25.61 KG/M2 | DIASTOLIC BLOOD PRESSURE: 70 MMHG | HEIGHT: 68 IN | TEMPERATURE: 98 F | HEART RATE: 77 BPM | SYSTOLIC BLOOD PRESSURE: 103 MMHG

## 2025-03-13 DIAGNOSIS — R19.7 DIARRHEA: ICD-10-CM

## 2025-03-13 DIAGNOSIS — K21.9 ESOPHAGEAL REFLUX: ICD-10-CM

## 2025-03-13 DIAGNOSIS — D50.9 IDA (IRON DEFICIENCY ANEMIA): ICD-10-CM

## 2025-03-13 DIAGNOSIS — Z12.11 COLON CANCER SCREENING: ICD-10-CM

## 2025-03-13 DIAGNOSIS — K57.92 DIVERTICULITIS: ICD-10-CM

## 2025-03-13 DIAGNOSIS — K85.90 PANCREATITIS: ICD-10-CM

## 2025-03-13 PROCEDURE — 99214 OFFICE O/P EST MOD 30 MIN: CPT | Performed by: NURSE PRACTITIONER

## 2025-03-13 RX ORDER — LOVASTATIN 40 MG/1
1 TABLET WITH THE EVENING MEAL TABLET ORAL ONCE A DAY
Status: ACTIVE | COMMUNITY

## 2025-03-13 RX ORDER — CHOLECALCIFEROL (VITAMIN D3) 125 MCG
1 TABLET TABLET ORAL ONCE A DAY
Status: ACTIVE | COMMUNITY

## 2025-03-13 RX ORDER — ATENOLOL AND CHLORTHALIDONE 100; 25 MG/1; MG/1
TAKE 1 TABLET BY ORAL ROUTE ONCE DAILY TABLET ORAL 1
Qty: 0 | Refills: 0 | Status: ACTIVE | COMMUNITY
Start: 1900-01-01

## 2025-03-13 RX ORDER — LISINOPRIL 2.5 MG/1
1 TABLET TABLET ORAL ONCE A DAY
Refills: 0 | Status: ACTIVE | COMMUNITY
Start: 2015-10-02

## 2025-03-13 RX ORDER — ALLOPURINOL 100 MG/1
1 TABLET TABLET ORAL ONCE A DAY
Status: ACTIVE | COMMUNITY

## 2025-03-13 RX ORDER — COLESTIPOL HYDROCHLORIDE 1 G/1
1 TABLET TABLET, FILM COATED ORAL ONCE A DAY
Qty: 30 TABLET | Refills: 11 | OUTPATIENT
Start: 2025-03-13

## 2025-03-13 NOTE — HPI-TODAY'S VISIT:
6/24/2019 Mrs. Ritter presents for follow up of IRINEO, Pancreatitis, and diverticulitis. Since her last visit her repeat MRCP shows no recurrence or chronic findings of pancreatitis. Her labs reveal significant IRINEO s/p EGD/Colon last year with no etiology. She is s/p VCE with gastritis, no bleeding or lesions. She is following with Dr. Stratton s/p IV iron with H/H up to above 10. She recently had another flare of diverticulitis documented on CT in sigmoid at [facility]. She was treated with flagyl and cipro and had to go back to the ED with epigastric pain after being on the flagyl again and this was discontinued. She is still taking her ranitidine daily and zenpep AC with improvement in her bowels, she does have some mild LLQ pain after her diverticulitis flare. Today she is doing better otherwise. MB  1/2/2020 Mrs. ritter presents for follow up of IRINEO, pancreatitis, and diverticulitis. Since her last visit she has developed worsening post prandial epigastric abdominal pain. This has increased over the last week. This is not acute like the pancreatitis. She does not have nausea or vomiting with the pain. She is not taking anything for the symptoms. She denies any significant NSAID use. Her last EGD was in 2018 with gastritis. Her pancreatitis was diagnosed after diverticulitis and likely medication induced. Today this is her main complaint. MB  2/13/2020 Mrs. Ritter presents for [facility] of IRINEO, history of pancreatitis, diverticulitis and epigastric abdominal pain. Since her last visit her labs show mildly positive H. pylori IGG. She did not get the pepcid as it is on back order. She continues to have epgiastric abdominal pain with dyspepsia. MB   8/11/20 The patient presents today for follow-up of her anemia and history of abdominal pain with diarrhea.  Since our last visit, her reflux has improved on PPI.  She also has been feeling better since she has been on a probiotic.  She does continue to have occasional diarrhea.  She was recently put on colchicine for her gout, and she does think now that she is off the colchicine, her diarrhea has improved.  She does have labs due this week with hematology.  We do not have these results today.  Overall, from a GI standpoint, she is feeling quite well today.  2/9/2021 Ms. Ritter presents today for follow-up of her anemia and abdominal pain.  Since our last visit, she has developed worsening left lower quadrant abdominal pain.  She feels that her bowels are not moving as regularly as they were previously.  She is taking daily Metamucil, but she is not taking anything else for her bowels.  She continues to take Protonix 40 mg once a day, and this does seem to control her reflux.  She does have a history of diverticulosis as well.  She has had an EGD and colonoscopy in her work-up for iron deficiency anemia.  She thinks she has had her blood work done recently, but this was for gout.  She is willing to have her blood work really drawn today.  Overall, she continues to complain of left lower quadrant abdominal pain with constipation.  5/11/2021 Mrs. Ritter presents for follow-up from [facility] with recent evaluation in the ED for abdominal pain diarrhea and rectal bleeding.  She had a contrasted CT scan that shows left-sided colitis, there is not 1 specific diverticula inflamed but she has moderate diverticulosis of the left colon.  She does have a history of documented diverticulitis last in 2019.  At that time she was treated with Flagyl and Rocephin and subsequently developed pancreatitis.  She has a history of gallstone pancreatitis in the distant past, there was concern that the Flagyl caused pancreatitis during her diverticulitis treatment she was given Flagyl and Omnicef on discharge.  She started the Flagyl with severe epigastric pain nausea and vomiting.  She has been off antibiotics for 24 hours and her left lower quadrant pain and diarrhea have worsened.  She does notice bright red blood per rectum.  Her last colonoscopy was by Dr. Mclaughlin in 2018 with left-sided diverticulosis.  She is not allergic to penicillin.  She does not feel like she is having any symptoms of pancreatitis.  Today she agrees to pursue Augmentin therapy for diverticulitis.  MB   10/18/2021 Mrs. Ritter presents for follow-up of diverticulitis.  Since her last visit she completed 5 days of Augmentin.  Her abdominal pain improved, however it caused such bad nausea she could not tolerate the medication for more than the 5 days.  Since discontinuing the antibiotic she has been constipated.  She has not been taking the MiraLAX regularly.  She has been trying to take fiber but does report bloating.  She does have some dull left lower quadrant abdominal aching but no fevers, she does not feel like she is having acute diverticulitis again.  Today we have discussed repeating her colonoscopy and given her persistent anemia and new onset diverticulitis she agrees to pursue.  She agrees to restart the bowel regimen with MiraLAX and fiber daily.  MB 1/26/2022 Claire Dunaway presents for follow-up of recurrent diverticulitis.  Since her last visit her colonoscopy in late November reveals significant left-sided diverticular disease, mild active inflammation, diverticular spasm, and narrowing due to diverticular disease.  She has had no further severe flares of diverticulitis but does complain of left lower quadrant pressure.  She has a bowel movement every other day.  She is taking MiraLAX daily and Metamucil at night but has run out this week.  Today we had a long discussion regarding constipation.  She does not want to seek surgical evaluation at this point.  In the past year she has had 3 flares that have required antibiotic treatment.  Today she agrees to be aggressive with her bowel regimen and to try Xifaxan for spasm.  If she has another flare would treat her with Augmentin with a history of pancreatitis secondary to gallstones with flare after Flagyl.  She will also need a surgery referral if she has another flare.  MB 7/19/2022 Claire presents for follow-up of diverticulitis.  Since her last visit she has had no further flares.  She is taking MiraLAX daily but has left off her Metamucil recently.  We had a long discussion regarding this importance.  She had no further episodes of pancreatitis.  She denies any diarrhea.  Her reflux is stable on Protonix 40 mg daily, she does not want to wean at this time.  Her lab work in January of this year shows no anemia.  Today we have discussed surgery again.  At this point she continues to defer.  She agrees to consider if she has another flare.  I am getting call in Augmentin for her to have on hand if she needs it.  MB  1/17/2023 Claire Dunaway presents for follow-up of recurrent diverticulitis.  Since her last visit she continues to have intermittent episodes of acute left lower quadrant abdominal pain that may last for days.  She is taking her fiber capsules at night and MiraLAX on a daily basis.  She still has some incomplete evacuation.  Her colonoscopy in November 2021 reveals a sigmoid stricture due to diverticular disease.  Recently she felt like she had a recurrent episode of full-blown diverticulitis, she had a sinus infection at the same time and was treated with amoxicillin.  After 5 days of the amoxicillin her abdominal pain improved however her sinus infection did not and she was transition to a Z-Nam.  Today she  on exam.  We have discussed aggressive bowel regimen.  I am going to proceed with repeat CT imaging to rule active diverticulitis.  She has had a reaction to Flagyl in the past with possible pancreatitis.  If she has active inflammation would proceed with 10 days of Augmentin along with surgery referral.  We are can increase her bowel regimen to MiraLAX twice daily and continue psyllium at night for now.  She understands she needs to proceed to the emergency department with severe pain.  We will follow-up pending her imaging, she is interested in pursuing surgery for recurrent diverticulitis given her past 3 years of episodes.  MB 6/26/2023 Claire presents for follow-up of recurrent diverticulitis status post sigmoid resection.  She was admitted and underwent sigmoid resection laparoscopic and robotically in April 2023 by Dr. Arnie Giraldo.  She did well postoperatively with mild renal impairment.  Her hemoglobin on discharge was 8.1 but no signs or symptoms of GI bleeding.  She had blood work last week with Dr. Samayoa and this is pending.  She is back on her psyllium at night and 1 capful of MiraLAX daily.  She has had no further flares of abdominal pain.  Her reflux is stable on pantoprazole 40 mg daily and she agrees to wean to 20 mg daily.  Today she is doing well.  MB 12/18/2023 Claire presents for follow-up of constipation and history of diverticulitis.  She is doing great postop.  She is on MiraLAX twice daily and 2 capsules of Metamucil at night.  She feels her stools are a little loose, we have discussed she can back the MiraLAX down to once daily.  She has blood work due with her primary care physician in January, she has had some with Dr. Stratton this fall which we will try and get a copy of.  Her reflux is stable on the pantoprazole 20 mg daily.  Today she denies any new GI complaints.  MB 3/13/2025 Claire presents for follow-up.  Since her last visit she is been doing well.  Her reflux is stable on pantoprazole 20 mg daily.  She is on psyllium twice daily, she continues to have 2-3 loose urgent bowel movements daily, she does agree to add colestipol.  She is due for colonoscopy next year.  She has had no further episodes of diverticulitis or pancreatitis.  Today she is doing well otherwise.  MB

## 2025-05-28 ENCOUNTER — CLAIMS CREATED FROM THE CLAIM WINDOW (OUTPATIENT)
Dept: URBAN - METROPOLITAN AREA MEDICAL CENTER 1 | Facility: MEDICAL CENTER | Age: 70
End: 2025-05-28

## 2025-05-28 PROCEDURE — 99255 IP/OBS CONSLTJ NEW/EST HI 80: CPT | Performed by: INTERNAL MEDICINE

## 2025-05-29 ENCOUNTER — CLAIMS CREATED FROM THE CLAIM WINDOW (OUTPATIENT)
Dept: URBAN - METROPOLITAN AREA MEDICAL CENTER 1 | Facility: MEDICAL CENTER | Age: 70
End: 2025-05-29

## 2025-05-29 PROCEDURE — 99232 SBSQ HOSP IP/OBS MODERATE 35: CPT | Performed by: INTERNAL MEDICINE

## 2025-05-30 ENCOUNTER — CLAIMS CREATED FROM THE CLAIM WINDOW (OUTPATIENT)
Dept: URBAN - METROPOLITAN AREA MEDICAL CENTER 1 | Facility: MEDICAL CENTER | Age: 70
End: 2025-05-30

## 2025-05-30 PROCEDURE — 45380 COLONOSCOPY AND BIOPSY: CPT | Performed by: INTERNAL MEDICINE
